# Patient Record
Sex: MALE | Race: WHITE | NOT HISPANIC OR LATINO | URBAN - METROPOLITAN AREA
[De-identification: names, ages, dates, MRNs, and addresses within clinical notes are randomized per-mention and may not be internally consistent; named-entity substitution may affect disease eponyms.]

---

## 2022-11-15 NOTE — H&P ADULT - NSHPPHYSICALEXAM_GEN_ALL_CORE
Vital Signs Last 24 Hrs  T(C): 36.8 (16 Nov 2022 07:15), Max: 36.8 (16 Nov 2022 07:15)  T(F): 98.3 (16 Nov 2022 07:15), Max: 98.3 (16 Nov 2022 07:15)  HR: 85 (16 Nov 2022 07:15) (85 - 85)  BP: 134/75 (16 Nov 2022 07:15) (134/75 - 134/75)  RR: 16 (16 Nov 2022 07:15) (16 - 16)  SpO2: 97% (16 Nov 2022 07:15) (97% - 97%)    Physical Exam:   Constitutional: well developed, well nourished, no deformities and no acute distress  Neurological: Alert & Oriented x 3, ELIZABETH, no focal deficits  HEENT: NC/AT, PERRLA, EOMI,  Neck supple.  Respiratory: CTA B/L, No wheezing/crackles/rhonchi  Cardiovascular: (+) S1 & S2, RRR, No murmur/ rub/ gallop   Gastrointestinal: soft, Nontender, nondistended, (+) BS  Genitourinary: non distended bladder, voiding freely  Extremities: No pedal edema, No clubbing, No cyanosis, 2+  PT/ DP pulses   Skin:  normal skin color and pigmentation, no skin lesions

## 2022-11-15 NOTE — H&P ADULT - NSHPLABSRESULTS_GEN_ALL_CORE
SPECT moderate sized area of inferior wall ischemia EF 62% mild basal inferior hypokinesis Stress test (10/26/22):  moderate sized area of inferior wall ischemia EF 62% mild basal inferior hypokinesis  EKG (11/16/22): NSR at 83 bpm

## 2022-11-15 NOTE — H&P ADULT - NSICDXPASTMEDICALHX_GEN_ALL_CORE_FT
PAST MEDICAL HISTORY:  Toro esophagus     Chronic kidney disease (CKD)     HLD (hyperlipidemia)     HTN (hypertension)

## 2022-11-15 NOTE — H&P ADULT - HISTORY OF PRESENT ILLNESS
77yr old male PMH HLD, CKD, BPH, Toro's esophagus  77yr old male PMH HLD, CKD, BPH, Toro's esophagus, recent sepsis secondary to UTI in 9/2022, developed Afib with RVR at that time (currently not on AC) presented to cardiology office for follow up. NSR on EKG, Pt underwent stress test, which revealed inferior wall ischemia. Pt denies chest pain, SOB, palpitation, light headedness or dizziness. Pt referred for cardiac cath for further ischemic evaluation.   COVID-19 PCR (11/14/22): Notdetect

## 2022-11-15 NOTE — H&P ADULT - ASSESSMENT
77yr old male PMH HLD, CKD, BPH, Toro's esophagus, recent sepsis secondary to UTI in 9/2022, developed Afib with RVR at that time (currently not on AC) presented to cardiology office for follow up. NSR on EKG, Pt underwent stress test, which revealed inferior wall ischemia. Pt denies chest pain, SOB, palpitation, light handedness or dizziness. Pt referred for cardiac cath for further ischemic evaluation.     ASA class: II  Cr:   GFR:  Bleeding risk:  Jayesh  score:    77yr old male PMH HLD, CKD, BPH, Toro's esophagus, recent sepsis secondary to UTI in 9/2022, developed Afib with RVR at that time (currently not on AC) presented to cardiology office for follow up. NSR on EKG, Pt underwent stress test, which revealed inferior wall ischemia. Pt denies chest pain, SOB, palpitation, light handedness or dizziness. Pt referred for cardiac cath for further ischemic evaluation.     ASA class: II  Cr: 1.6  GFR: 44  Bleeding risk: 2.6%   Jayesh  score: 5 point

## 2022-11-15 NOTE — ASU PATIENT PROFILE, ADULT - MEDICATIONS TO TAKE
May take medications as prescribed with sips of water. Pt will take aspirin as instructed by Dr Walton's office

## 2022-11-15 NOTE — H&P ADULT - PROBLEM SELECTOR PLAN 1
- PRICILA protocol pre hydration: NS 250cc IV bolus x1   - Procedure, its risks, alternatives, benefits and potential complications were discussed in detail. Risks include but not limited to bleeding, infection, allergy, renal failure requiring dialysis, stroke, vascular injury, pericardial tamponade, arrhythmias, MI and even death. Pt is agreeable and has consented for the procedure.

## 2022-11-16 ENCOUNTER — OUTPATIENT (OUTPATIENT)
Dept: OUTPATIENT SERVICES | Facility: HOSPITAL | Age: 77
LOS: 1 days | Discharge: HOME | End: 2022-11-16
Payer: MEDICARE

## 2022-11-16 VITALS
HEIGHT: 71 IN | SYSTOLIC BLOOD PRESSURE: 134 MMHG | WEIGHT: 190.04 LBS | OXYGEN SATURATION: 97 % | DIASTOLIC BLOOD PRESSURE: 75 MMHG | HEART RATE: 85 BPM | TEMPERATURE: 98 F | RESPIRATION RATE: 16 BRPM

## 2022-11-16 VITALS
DIASTOLIC BLOOD PRESSURE: 61 MMHG | RESPIRATION RATE: 16 BRPM | HEART RATE: 77 BPM | OXYGEN SATURATION: 98 % | SYSTOLIC BLOOD PRESSURE: 111 MMHG

## 2022-11-16 DIAGNOSIS — R94.39 ABNORMAL RESULT OF OTHER CARDIOVASCULAR FUNCTION STUDY: ICD-10-CM

## 2022-11-16 DIAGNOSIS — I25.10 ATHEROSCLEROTIC HEART DISEASE OF NATIVE CORONARY ARTERY WITHOUT ANGINA PECTORIS: ICD-10-CM

## 2022-11-16 DIAGNOSIS — R06.02 SHORTNESS OF BREATH: ICD-10-CM

## 2022-11-16 LAB
ANION GAP SERPL CALC-SCNC: 6 MMOL/L — SIGNIFICANT CHANGE UP (ref 5–17)
BUN SERPL-MCNC: 22 MG/DL — SIGNIFICANT CHANGE UP (ref 7–23)
CALCIUM SERPL-MCNC: 8.9 MG/DL — SIGNIFICANT CHANGE UP (ref 8.5–10.1)
CHLORIDE SERPL-SCNC: 111 MMOL/L — HIGH (ref 96–108)
CO2 SERPL-SCNC: 23 MMOL/L — SIGNIFICANT CHANGE UP (ref 22–31)
CREAT SERPL-MCNC: 1.6 MG/DL — HIGH (ref 0.5–1.3)
EGFR: 44 ML/MIN/1.73M2 — LOW
GLUCOSE SERPL-MCNC: 103 MG/DL — HIGH (ref 70–99)
HCT VFR BLD CALC: 43.6 % — SIGNIFICANT CHANGE UP (ref 39–50)
HGB BLD-MCNC: 14.8 G/DL — SIGNIFICANT CHANGE UP (ref 13–17)
MCHC RBC-ENTMCNC: 30.5 PG — SIGNIFICANT CHANGE UP (ref 27–34)
MCHC RBC-ENTMCNC: 33.9 GM/DL — SIGNIFICANT CHANGE UP (ref 32–36)
MCV RBC AUTO: 89.7 FL — SIGNIFICANT CHANGE UP (ref 80–100)
PLATELET # BLD AUTO: 249 K/UL — SIGNIFICANT CHANGE UP (ref 150–400)
POTASSIUM SERPL-MCNC: 4.2 MMOL/L — SIGNIFICANT CHANGE UP (ref 3.5–5.3)
POTASSIUM SERPL-SCNC: 4.2 MMOL/L — SIGNIFICANT CHANGE UP (ref 3.5–5.3)
RBC # BLD: 4.86 M/UL — SIGNIFICANT CHANGE UP (ref 4.2–5.8)
RBC # FLD: 13.8 % — SIGNIFICANT CHANGE UP (ref 10.3–14.5)
SODIUM SERPL-SCNC: 140 MMOL/L — SIGNIFICANT CHANGE UP (ref 135–145)
WBC # BLD: 10.83 K/UL — HIGH (ref 3.8–10.5)
WBC # FLD AUTO: 10.83 K/UL — HIGH (ref 3.8–10.5)

## 2022-11-16 PROCEDURE — C1894: CPT

## 2022-11-16 PROCEDURE — 93458 L HRT ARTERY/VENTRICLE ANGIO: CPT

## 2022-11-16 PROCEDURE — 85027 COMPLETE CBC AUTOMATED: CPT

## 2022-11-16 PROCEDURE — 80048 BASIC METABOLIC PNL TOTAL CA: CPT

## 2022-11-16 PROCEDURE — 36415 COLL VENOUS BLD VENIPUNCTURE: CPT

## 2022-11-16 PROCEDURE — 93010 ELECTROCARDIOGRAM REPORT: CPT

## 2022-11-16 PROCEDURE — 93005 ELECTROCARDIOGRAM TRACING: CPT

## 2022-11-16 PROCEDURE — C1887: CPT

## 2022-11-16 PROCEDURE — C1769: CPT

## 2022-11-16 RX ORDER — DILTIAZEM HCL 120 MG
1 CAPSULE, EXT RELEASE 24 HR ORAL
Qty: 0 | Refills: 0 | DISCHARGE

## 2022-11-16 RX ORDER — SODIUM CHLORIDE 9 MG/ML
250 INJECTION INTRAMUSCULAR; INTRAVENOUS; SUBCUTANEOUS ONCE
Refills: 0 | Status: COMPLETED | OUTPATIENT
Start: 2022-11-16 | End: 2022-11-16

## 2022-11-16 RX ORDER — TAMSULOSIN HYDROCHLORIDE 0.4 MG/1
0.4 CAPSULE ORAL
Qty: 0 | Refills: 0 | DISCHARGE

## 2022-11-16 RX ORDER — ASPIRIN/CALCIUM CARB/MAGNESIUM 324 MG
1 TABLET ORAL
Qty: 0 | Refills: 0 | DISCHARGE

## 2022-11-16 RX ORDER — OMEPRAZOLE 10 MG/1
1 CAPSULE, DELAYED RELEASE ORAL
Qty: 0 | Refills: 0 | DISCHARGE

## 2022-11-16 RX ORDER — SODIUM CHLORIDE 9 MG/ML
1000 INJECTION INTRAMUSCULAR; INTRAVENOUS; SUBCUTANEOUS
Refills: 0 | Status: DISCONTINUED | OUTPATIENT
Start: 2022-11-16 | End: 2022-11-16

## 2022-11-16 RX ORDER — ASPIRIN/CALCIUM CARB/MAGNESIUM 324 MG
1 TABLET ORAL
Qty: 90 | Refills: 4
Start: 2022-11-16 | End: 2024-02-08

## 2022-11-16 RX ADMIN — SODIUM CHLORIDE 258 MILLILITER(S): 9 INJECTION INTRAMUSCULAR; INTRAVENOUS; SUBCUTANEOUS at 10:38

## 2022-11-16 RX ADMIN — SODIUM CHLORIDE 500 MILLILITER(S): 9 INJECTION INTRAMUSCULAR; INTRAVENOUS; SUBCUTANEOUS at 07:34

## 2022-11-16 NOTE — PROGRESS NOTE ADULT - SUBJECTIVE AND OBJECTIVE BOX
HPI:  77yr old male PMH HLD, CKD, BPH, Toro's esophagus, recent sepsis secondary to UTI in 9/2022, developed Afib with RVR at that time (currently not on AC due to h/o hematuria) presented to cardiology office for follow up. NSR on EKG, Pt underwent stress test, which revealed inferior wall ischemia. Pt denies chest pain, SOB, palpitation, light headedness or dizziness. Pt referred for cardiac cath for further ischemic evaluation.     Now, Pt is s/p LHC, revealed  of RCA with collaterals from Lt system.     ROS: denies chest pain/ pressure, SOB or palpitation     Vital Signs;  T(C): 36.8 (11-16-22 @ 07:15), Max: 36.8 (11-16-22 @ 07:15)  HR: 72 (11-16-22 @ 10:30) (72 - 85)  BP: 118/68 (11-16-22 @ 10:30) (109/90 - 134/75)  RR: 15 (11-16-22 @ 10:30) (15 - 16)  SpO2: 98% (11-16-22 @ 10:30) (97% - 98%)    PHYSICAL EXAM:  GENERAL: NAD, well-groomed, well-developed  HEENT - NC/AT, pupils equal and reactive to light,  ; Moist mucous membranes, Good dentition, No lesions  NECK: Supple, No JVD  CHEST/LUNG: Clear to auscultation bilaterally; No rales, rhonchi, wheezing  HEART: Regular rate and rhythm; No murmurs, rubs, or gallops  ABDOMEN: Soft, Nontender, Nondistended; Bowel sounds present  EXTREMITIES:  2+ Peripheral Pulses, No clubbing, cyanosis, or edema  NEURO:  No Focal deficits, sensory and motor intact  SKIN: No rashes or lesions  Access site: Rt radial access site with radial band (to be removed in 1 hr post procedure): no hematoma or bleeding, + Capillary refill < 2 sec.     Labs:LABS: All Labs Reviewed:                        14.8   10.83 )-----------( 249      ( 16 Nov 2022 07:30 )             43.6     11-16    140  |  111<H>  |  22  ----------------------------<  103<H>  4.2   |  23  |  1.60<H>    Ca    8.9      16 Nov 2022 07:30    Home Medications:  dilTIAZem 120 mg/24 hours oral capsule, extended release: 1 cap(s) orally once a day (16 Nov 2022 07:21)  omeprazole 40 mg oral delayed release capsule: 1 cap(s) orally once a day (16 Nov 2022 07:21)  pravastatin 40 mg oral tablet: 1 tab(s) orally once a day (16 Nov 2022 07:21)  tamsulosin 0.4 mg oral capsule: 0.4 milligram(s) orally 2 times a day (16 Nov 2022 07:21)    Medications:  sodium chloride 0.9%. 1000 milliLiter(s) IV Continuous <Continuous>    A/P:  77yr old male PMH HLD, CKD, BPH, Toro's esophagus, recent sepsis secondary to UTI in 9/2022, developed Afib with RVR at that time (currently not on AC due to h/o hematuria) presented to cardiology office for follow up. NSR on EKG, Pt underwent stress test, which revealed inferior wall ischemia. Pt underwent cardiac cath, revealed  of RCA with collaterals from Lt.   - post IV hydration  - switch  mg to 81 mg daily   - continue CCB  - continue statin  - post procedure, outcome and follow up care reviewed with patient and Dr. Walton   - Discussed therapeutic lifestyle modifications to reduce CAD risk factors including cardiac diet, weight control, exercise, smoking cessation, medication compliance and regular outpt follow-up.   - discharge home today   - follow up with cardiologist in 1-2 weeks as an outpt     Discussed the plan with Dr. Walton, Pt and Cath RN.

## 2023-02-24 PROBLEM — E78.5 HYPERLIPIDEMIA, UNSPECIFIED: Chronic | Status: ACTIVE | Noted: 2022-11-16

## 2023-02-24 PROBLEM — I10 ESSENTIAL (PRIMARY) HYPERTENSION: Chronic | Status: ACTIVE | Noted: 2022-11-16

## 2023-02-24 PROBLEM — N18.9 CHRONIC KIDNEY DISEASE, UNSPECIFIED: Chronic | Status: ACTIVE | Noted: 2022-11-16

## 2023-02-24 PROBLEM — K22.70 BARRETT'S ESOPHAGUS WITHOUT DYSPLASIA: Chronic | Status: ACTIVE | Noted: 2022-11-16

## 2023-03-07 PROBLEM — Z00.00 ENCOUNTER FOR PREVENTIVE HEALTH EXAMINATION: Status: ACTIVE | Noted: 2023-03-07

## 2023-03-08 ENCOUNTER — TRANSCRIPTION ENCOUNTER (OUTPATIENT)
Age: 78
End: 2023-03-08

## 2023-03-09 ENCOUNTER — APPOINTMENT (OUTPATIENT)
Dept: MRI IMAGING | Facility: CLINIC | Age: 78
End: 2023-03-09

## 2023-04-21 ENCOUNTER — OFFICE (OUTPATIENT)
Dept: URBAN - METROPOLITAN AREA CLINIC 12 | Facility: CLINIC | Age: 78
Setting detail: OPHTHALMOLOGY
End: 2023-04-21
Payer: COMMERCIAL

## 2023-04-21 VITALS — HEIGHT: 60 IN

## 2023-04-21 DIAGNOSIS — H35.373: ICD-10-CM

## 2023-04-21 DIAGNOSIS — H04.123: ICD-10-CM

## 2023-04-21 DIAGNOSIS — H18.513: ICD-10-CM

## 2023-04-21 DIAGNOSIS — H02.403: ICD-10-CM

## 2023-04-21 DIAGNOSIS — Z96.1: ICD-10-CM

## 2023-04-21 DIAGNOSIS — H20.013: ICD-10-CM

## 2023-04-21 DIAGNOSIS — H43.813: ICD-10-CM

## 2023-04-21 DIAGNOSIS — H40.013: ICD-10-CM

## 2023-04-21 PROCEDURE — 92014 COMPRE OPH EXAM EST PT 1/>: CPT | Performed by: OPHTHALMOLOGY

## 2023-04-21 PROCEDURE — 92286 ANT SGM IMG I&R SPECLR MIC: CPT | Performed by: OPHTHALMOLOGY

## 2023-04-21 PROCEDURE — 92083 EXTENDED VISUAL FIELD XM: CPT | Performed by: OPHTHALMOLOGY

## 2023-04-21 PROCEDURE — 92133 CPTRZD OPH DX IMG PST SGM ON: CPT | Performed by: OPHTHALMOLOGY

## 2023-04-21 ASSESSMENT — REFRACTION_MANIFEST
OS_ADD: +2.75
OD_CYLINDER: -0.25
OS_SPHERE: +0.75
OD_VA1: 20/20
OD_AXIS: 40
OS_CYLINDER: -0.50
OD_SPHERE: +0.75
OS_AXIS: 085
OS_VA1: 20/20
OD_ADD: +2.75

## 2023-04-21 ASSESSMENT — REFRACTION_CURRENTRX
OS_VPRISM_DIRECTION: PROGS
OD_VPRISM_DIRECTION: PROGS
OD_VPRISM_DIRECTION: PROGS
OD_CYLINDER: -0.25
OS_OVR_VA: 20/
OS_SPHERE: +0.25
OS_CYLINDER: -0.25
OS_ADD: +2.25
OD_AXIS: 000
OD_CYLINDER: SPHERE
OD_ADD: +2.50
OD_SPHERE: +0.75
OS_VPRISM_DIRECTION: PROGS
OS_AXIS: 077
OS_SPHERE: +0.75
OD_OVR_VA: 20/
OD_SPHERE: PLANO
OS_CYLINDER: -0.75
OS_OVR_VA: 20/
OS_AXIS: 080
OD_ADD: +2.25
OD_AXIS: 038
OD_OVR_VA: 20/
OS_ADD: +2.50

## 2023-04-21 ASSESSMENT — REFRACTION_AUTOREFRACTION
OD_SPHERE: +0.75
OS_AXIS: 087
OD_CYLINDER: -0.50
OD_AXIS: 037
OS_SPHERE: +1.00
OS_CYLINDER: -0.75

## 2023-04-21 ASSESSMENT — CONFRONTATIONAL VISUAL FIELD TEST (CVF)
OD_FINDINGS: FULL
OS_FINDINGS: FULL

## 2023-04-21 ASSESSMENT — VISUAL ACUITY
OS_BCVA: 20/25
OD_BCVA: 20/25

## 2023-04-21 ASSESSMENT — KERATOMETRY
OD_K2POWER_DIOPTERS: 42.50
OD_AXISANGLE_DEGREES: 125
OS_K1POWER_DIOPTERS: 41.25
OS_K2POWER_DIOPTERS: 42.00
OD_K1POWER_DIOPTERS: 41.25
METHOD_AUTO_MANUAL: AUTO
OS_AXISANGLE_DEGREES: 155

## 2023-04-21 ASSESSMENT — TONOMETRY
OS_IOP_MMHG: 17
OD_IOP_MMHG: 18

## 2023-04-21 ASSESSMENT — SPHEQUIV_DERIVED
OD_SPHEQUIV: 0.625
OS_SPHEQUIV: 0.625
OS_SPHEQUIV: 0.5
OD_SPHEQUIV: 0.5

## 2023-04-21 ASSESSMENT — LID POSITION - PTOSIS
OS_PTOSIS: LUL 2+
OD_PTOSIS: RUL 2+ 3+

## 2023-04-21 ASSESSMENT — AXIALLENGTH_DERIVED
OS_AL: 24.0457
OS_AL: 24.0964
OD_AL: 23.9507
OD_AL: 24.0009

## 2023-04-21 ASSESSMENT — SUPERFICIAL PUNCTATE KERATITIS (SPK)
OD_SPK: T
OS_SPK: T

## 2023-04-21 ASSESSMENT — CORNEAL DYSTROPHY
OD_DYSTROPHY: CORNEAL
OS_DYSTROPHY: CORNEAL

## 2024-04-26 ENCOUNTER — OFFICE (OUTPATIENT)
Dept: URBAN - METROPOLITAN AREA CLINIC 12 | Facility: CLINIC | Age: 79
Setting detail: OPHTHALMOLOGY
End: 2024-04-26
Payer: COMMERCIAL

## 2024-04-26 VITALS — HEIGHT: 60 IN

## 2024-04-26 DIAGNOSIS — H43.813: ICD-10-CM

## 2024-04-26 DIAGNOSIS — H04.123: ICD-10-CM

## 2024-04-26 DIAGNOSIS — H35.373: ICD-10-CM

## 2024-04-26 DIAGNOSIS — H20.013: ICD-10-CM

## 2024-04-26 DIAGNOSIS — H26.493: ICD-10-CM

## 2024-04-26 DIAGNOSIS — H02.403: ICD-10-CM

## 2024-04-26 DIAGNOSIS — H18.513: ICD-10-CM

## 2024-04-26 DIAGNOSIS — H40.013: ICD-10-CM

## 2024-04-26 PROBLEM — H16.223 DRY EYE SYNDROME K SICCA; BOTH EYES: Status: ACTIVE | Noted: 2024-04-26

## 2024-04-26 PROCEDURE — 92134 CPTRZ OPH DX IMG PST SGM RTA: CPT | Performed by: OPHTHALMOLOGY

## 2024-04-26 PROCEDURE — 92083 EXTENDED VISUAL FIELD XM: CPT | Performed by: OPHTHALMOLOGY

## 2024-04-26 PROCEDURE — 92014 COMPRE OPH EXAM EST PT 1/>: CPT | Performed by: OPHTHALMOLOGY

## 2024-04-26 ASSESSMENT — LID POSITION - PTOSIS
OS_PTOSIS: LUL 2+
OD_PTOSIS: RUL 2+ 3+

## 2025-04-25 ENCOUNTER — OFFICE (OUTPATIENT)
Dept: URBAN - METROPOLITAN AREA CLINIC 12 | Facility: CLINIC | Age: 80
Setting detail: OPHTHALMOLOGY
End: 2025-04-25
Payer: COMMERCIAL

## 2025-04-25 DIAGNOSIS — H35.373: ICD-10-CM

## 2025-04-25 DIAGNOSIS — H40.013: ICD-10-CM

## 2025-04-25 DIAGNOSIS — H02.403: ICD-10-CM

## 2025-04-25 DIAGNOSIS — H18.513: ICD-10-CM

## 2025-04-25 PROCEDURE — 92083 EXTENDED VISUAL FIELD XM: CPT | Performed by: OPHTHALMOLOGY

## 2025-04-25 PROCEDURE — 92014 COMPRE OPH EXAM EST PT 1/>: CPT | Performed by: OPHTHALMOLOGY

## 2025-04-25 PROCEDURE — 92133 CPTRZD OPH DX IMG PST SGM ON: CPT | Performed by: OPHTHALMOLOGY

## 2025-04-25 PROCEDURE — 92286 ANT SGM IMG I&R SPECLR MIC: CPT | Performed by: OPHTHALMOLOGY

## 2025-04-25 ASSESSMENT — REFRACTION_MANIFEST
OD_ADD: +2.75
OD_VA1: 20/20
OS_CYLINDER: -0.75
OS_SPHERE: +1.00
OS_CYLINDER: -0.50
OD_SPHERE: +0.75
OS_AXIS: 085
OD_AXIS: 40
OD_SPHERE: +0.75
OS_SPHERE: +0.75
OD_AXIS: 050
OD_ADD: +2.75
OD_CYLINDER: -0.25
OS_ADD: +2.75
OS_VA1: 20/20
OS_ADD: +2.75
OD_CYLINDER: -0.25
OS_AXIS: 083

## 2025-04-25 ASSESSMENT — CONFRONTATIONAL VISUAL FIELD TEST (CVF)
OS_FINDINGS: FULL
OD_FINDINGS: FULL

## 2025-04-25 ASSESSMENT — REFRACTION_CURRENTRX
OD_AXIS: 000
OS_SPHERE: +0.75
OS_ADD: +2.75
OS_SPHERE: +0.75
OS_CYLINDER: -0.75
OD_AXIS: 037
OD_ADD: +2.75
OD_CYLINDER: SPHERE
OD_OVR_VA: 20/
OD_ADD: +2.25
OS_AXIS: 077
OS_VPRISM_DIRECTION: PROGS
OS_ADD: +2.25
OD_VPRISM_DIRECTION: PROGS
OD_SPHERE: +0.75
OD_CYLINDER: -0.25
OD_SPHERE: +0.75
OS_VPRISM_DIRECTION: PROGS
OS_OVR_VA: 20/
OD_VPRISM_DIRECTION: PROGS
OS_AXIS: 097
OD_OVR_VA: 20/
OS_OVR_VA: 20/
OS_CYLINDER: -0.50

## 2025-04-25 ASSESSMENT — REFRACTION_AUTOREFRACTION
OS_CYLINDER: -1.25
OS_AXIS: 080
OD_AXIS: 057
OS_SPHERE: +1.25
OD_CYLINDER: -0.75
OD_SPHERE: +1.00

## 2025-04-25 ASSESSMENT — TONOMETRY
OD_IOP_MMHG: 14
OS_IOP_MMHG: 14

## 2025-04-25 ASSESSMENT — KERATOMETRY
METHOD_AUTO_MANUAL: AUTO
OS_AXISANGLE_DEGREES: 173
OD_AXISANGLE_DEGREES: 128
OS_K1POWER_DIOPTERS: 41.50
OD_K1POWER_DIOPTERS: 41.25
OD_K2POWER_DIOPTERS: 42.50
OS_K2POWER_DIOPTERS: 41.75

## 2025-04-25 ASSESSMENT — SUPERFICIAL PUNCTATE KERATITIS (SPK)
OS_SPK: T
OD_SPK: T

## 2025-04-25 ASSESSMENT — VISUAL ACUITY
OD_BCVA: 20/25-3
OS_BCVA: 20/20-2

## 2025-04-25 ASSESSMENT — LID POSITION - PTOSIS
OS_PTOSIS: LUL 2+
OD_PTOSIS: RUL 2+ 3+

## 2025-04-25 ASSESSMENT — CORNEAL DYSTROPHY
OD_DYSTROPHY: CORNEAL
OS_DYSTROPHY: CORNEAL